# Patient Record
Sex: MALE | URBAN - METROPOLITAN AREA
[De-identification: names, ages, dates, MRNs, and addresses within clinical notes are randomized per-mention and may not be internally consistent; named-entity substitution may affect disease eponyms.]

---

## 2020-08-20 ENCOUNTER — OFFICE VISIT (OUTPATIENT)
Dept: URBAN - METROPOLITAN AREA CLINIC 72 | Facility: CLINIC | Age: 65
End: 2020-08-20

## 2025-05-08 PROBLEM — 707724006: Status: ACTIVE | Noted: 2025-05-08

## 2025-05-08 PROBLEM — 191148006: Status: ACTIVE | Noted: 2025-05-08

## 2025-05-08 PROBLEM — 190633005: Status: ACTIVE | Noted: 2025-05-08

## 2025-05-08 PROBLEM — 284591009: Status: ACTIVE | Noted: 2025-05-08

## 2025-05-08 PROBLEM — 34713006: Status: ACTIVE | Noted: 2025-05-08

## 2025-05-09 ENCOUNTER — OFFICE VISIT (OUTPATIENT)
Dept: URBAN - METROPOLITAN AREA CLINIC 72 | Facility: CLINIC | Age: 70
End: 2025-05-09

## 2025-05-09 NOTE — HPI-OTHER HISTORIES
Labs: 4/24/2025-iron 136, UIBC 52, iron saturation 72, TIBC 188, ferritin 844, magnesium 1.83, WBC 3.47, hemoglobin 10.4, hematocrit 33.1, platelets 125, PSA 3.663, cholesterol 143, triglycerides 160, LDL 99, HDL 12, TSH 1.32, glucose 161, , K3.1, creatinine 0.63, BUN 11, , , , total bilirubin 3.9, folate 3.2, vitamin B12 734, vitamin D 10, hemoglobin A1c 5.2

## 2025-05-09 NOTE — HPI-TODAY'S VISIT:
Patient is a 70-year-old male referred by Dr. Bashir Garcia with [facility] for elevated liver enzymes.  Patient has chronic alcohol abuse.  He was reportedly drinking bourbon during his healthcare exam, according to referral.